# Patient Record
Sex: MALE | Race: OTHER | Employment: FULL TIME | URBAN - METROPOLITAN AREA
[De-identification: names, ages, dates, MRNs, and addresses within clinical notes are randomized per-mention and may not be internally consistent; named-entity substitution may affect disease eponyms.]

---

## 2021-07-10 ENCOUNTER — HOSPITAL ENCOUNTER (EMERGENCY)
Age: 26
Discharge: HOME | End: 2021-07-10

## 2021-07-10 ENCOUNTER — APPOINTMENT (OUTPATIENT)
Dept: GENERAL RADIOLOGY | Age: 26
End: 2021-07-10

## 2021-07-10 ENCOUNTER — APPOINTMENT (OUTPATIENT)
Dept: CT IMAGING | Age: 26
End: 2021-07-10

## 2021-07-10 VITALS
RESPIRATION RATE: 14 BRPM | WEIGHT: 165 LBS | HEART RATE: 70 BPM | BODY MASS INDEX: 25.9 KG/M2 | HEIGHT: 67 IN | TEMPERATURE: 98 F | DIASTOLIC BLOOD PRESSURE: 70 MMHG | SYSTOLIC BLOOD PRESSURE: 116 MMHG | OXYGEN SATURATION: 97 %

## 2021-07-10 DIAGNOSIS — S81.811A LACERATION OF RIGHT LOWER EXTREMITY, INITIAL ENCOUNTER: ICD-10-CM

## 2021-07-10 DIAGNOSIS — T07.XXXA MULTIPLE ABRASIONS: ICD-10-CM
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
## 2021-07-10 LAB
ABO/RH: NORMAL
ANION GAP SERPL CALCULATED.3IONS-SCNC: 14 MEQ/L (ref 9–15)
ANTIBODY SCREEN: NORMAL
APTT: 23.9 SEC (ref 24.4–36.8)
BASOPHILS ABSOLUTE: 0.1 K/UL (ref 0–0.2)
BASOPHILS RELATIVE PERCENT: 0.5 %
BUN BLDV-MCNC: 14 MG/DL (ref 6–20)
CALCIUM SERPL-MCNC: 10.4 MG/DL (ref 8.5–9.9)
CHLORIDE BLD-SCNC: 98 MEQ/L (ref 95–107)
CO2: 23 MEQ/L (ref 20–31)
CREAT SERPL-MCNC: 1.04 MG/DL (ref 0.7–1.2)
EOSINOPHILS ABSOLUTE: 0.1 K/UL (ref 0–0.7)
EOSINOPHILS RELATIVE PERCENT: 1.1 %
ETHANOL PERCENT: NORMAL G/DL
ETHANOL: <10 MG/DL (ref 0–0.08)
GFR AFRICAN AMERICAN: >60
GFR NON-AFRICAN AMERICAN: >60
GLUCOSE BLD-MCNC: 132 MG/DL (ref 70–99)
HCT VFR BLD CALC: 44.5 % (ref 42–52)
HEMOGLOBIN: 14.9 G/DL (ref 14–18)
INR BLD: 1.1
LYMPHOCYTES ABSOLUTE: 3.5 K/UL (ref 1–4.8)
LYMPHOCYTES RELATIVE PERCENT: 32.8 %
MCH RBC QN AUTO: 31 PG (ref 27–31.3)
MCHC RBC AUTO-ENTMCNC: 33.5 % (ref 33–37)
MCV RBC AUTO: 92.5 FL (ref 80–100)
MONOCYTES ABSOLUTE: 0.7 K/UL (ref 0.2–0.8)
MONOCYTES RELATIVE PERCENT: 6.8 %
NEUTROPHILS ABSOLUTE: 6.3 K/UL (ref 1.4–6.5)
NEUTROPHILS RELATIVE PERCENT: 58.8 %
PDW BLD-RTO: 13.5 % (ref 11.5–14.5)
PLATELET # BLD: 222 K/UL (ref 130–400)
POC CREATININE WHOLE BLOOD: 1.1
POTASSIUM SERPL-SCNC: 3.4 MEQ/L (ref 3.4–4.9)
PROTHROMBIN TIME: 13.9 SEC (ref 12.3–14.9)
RBC # BLD: 4.81 M/UL (ref 4.7–6.1)
SODIUM BLD-SCNC: 135 MEQ/L (ref 135–144)
WBC # BLD: 10.6 K/UL (ref 4.8–10.8)

## 2021-07-10 PROCEDURE — 6370000000 HC RX 637 (ALT 250 FOR IP): Performed by: EMERGENCY MEDICINE

## 2021-07-10 PROCEDURE — 96372 THER/PROPH/DIAG INJ SC/IM: CPT

## 2021-07-10 PROCEDURE — 12001 RPR S/N/AX/GEN/TRNK 2.5CM/<: CPT

## 2021-07-10 PROCEDURE — 99285 EMERGENCY DEPT VISIT HI MDM: CPT

## 2021-07-10 PROCEDURE — 36415 COLL VENOUS BLD VENIPUNCTURE: CPT

## 2021-07-10 PROCEDURE — 85730 THROMBOPLASTIN TIME PARTIAL: CPT

## 2021-07-10 PROCEDURE — 86850 RBC ANTIBODY SCREEN: CPT

## 2021-07-10 PROCEDURE — 85025 COMPLETE CBC W/AUTO DIFF WBC: CPT

## 2021-07-10 PROCEDURE — 72131 CT LUMBAR SPINE W/O DYE: CPT

## 2021-07-10 PROCEDURE — 6360000004 HC RX CONTRAST MEDICATION: Performed by: STUDENT IN AN ORGANIZED HEALTH CARE EDUCATION/TRAINING PROGRAM

## 2021-07-10 PROCEDURE — 90715 TDAP VACCINE 7 YRS/> IM: CPT | Performed by: STUDENT IN AN ORGANIZED HEALTH CARE EDUCATION/TRAINING PROGRAM

## 2021-07-10 PROCEDURE — 71260 CT THORAX DX C+: CPT

## 2021-07-10 PROCEDURE — 96374 THER/PROPH/DIAG INJ IV PUSH: CPT

## 2021-07-10 PROCEDURE — 73030 X-RAY EXAM OF SHOULDER: CPT

## 2021-07-10 PROCEDURE — 80048 BASIC METABOLIC PNL TOTAL CA: CPT

## 2021-07-10 PROCEDURE — 90471 IMMUNIZATION ADMIN: CPT | Performed by: STUDENT IN AN ORGANIZED HEALTH CARE EDUCATION/TRAINING PROGRAM

## 2021-07-10 PROCEDURE — 72125 CT NECK SPINE W/O DYE: CPT

## 2021-07-10 PROCEDURE — 74177 CT ABD & PELVIS W/CONTRAST: CPT

## 2021-07-10 PROCEDURE — 70450 CT HEAD/BRAIN W/O DYE: CPT

## 2021-07-10 PROCEDURE — 86900 BLOOD TYPING SEROLOGIC ABO: CPT

## 2021-07-10 PROCEDURE — 72128 CT CHEST SPINE W/O DYE: CPT

## 2021-07-10 PROCEDURE — 82077 ASSAY SPEC XCP UR&BREATH IA: CPT

## 2021-07-10 PROCEDURE — 6360000002 HC RX W HCPCS: Performed by: STUDENT IN AN ORGANIZED HEALTH CARE EDUCATION/TRAINING PROGRAM

## 2021-07-10 PROCEDURE — 86901 BLOOD TYPING SEROLOGIC RH(D): CPT

## 2021-07-10 PROCEDURE — 6360000002 HC RX W HCPCS: Performed by: EMERGENCY MEDICINE

## 2021-07-10 PROCEDURE — 6370000000 HC RX 637 (ALT 250 FOR IP): Performed by: STUDENT IN AN ORGANIZED HEALTH CARE EDUCATION/TRAINING PROGRAM

## 2021-07-10 PROCEDURE — 85610 PROTHROMBIN TIME: CPT

## 2021-07-10 RX ORDER — CYCLOBENZAPRINE HCL 10 MG
10 TABLET ORAL 3 TIMES DAILY PRN
Qty: 21 TABLET | Refills: 0 | Status: SHIPPED | OUTPATIENT
Start: 2021-07-10 | End: 2021-07-20

## 2021-07-10 RX ORDER — KETOROLAC TROMETHAMINE 30 MG/ML
30 INJECTION, SOLUTION INTRAMUSCULAR; INTRAVENOUS ONCE
Status: COMPLETED | OUTPATIENT
Start: 2021-07-10 | End: 2021-07-10

## 2021-07-10 RX ORDER — ONDANSETRON 2 MG/ML
4 INJECTION INTRAMUSCULAR; INTRAVENOUS ONCE
Status: COMPLETED | OUTPATIENT
Start: 2021-07-10 | End: 2021-07-10

## 2021-07-10 RX ORDER — DIAPER,BRIEF,INFANT-TODD,DISP
EACH MISCELLANEOUS ONCE
Status: COMPLETED | OUTPATIENT
Start: 2021-07-10 | End: 2021-07-10

## 2021-07-10 RX ORDER — OXYCODONE HYDROCHLORIDE AND ACETAMINOPHEN 5; 325 MG/1; MG/1
1 TABLET ORAL ONCE
Status: COMPLETED | OUTPATIENT
Start: 2021-07-10 | End: 2021-07-10

## 2021-07-10 RX ORDER — ONDANSETRON 4 MG/1
4 TABLET, ORALLY DISINTEGRATING ORAL EVERY 8 HOURS PRN
Qty: 15 TABLET | Refills: 0 | Status: SHIPPED | OUTPATIENT
Start: 2021-07-10 | End: 2021-07-15

## 2021-07-10 RX ORDER — GINSENG 100 MG
CAPSULE ORAL
Qty: 1 TUBE | Refills: 3 | Status: SHIPPED | OUTPATIENT
Start: 2021-07-10 | End: 2021-07-20

## 2021-07-10 RX ADMIN — TETANUS TOXOID, REDUCED DIPHTHERIA TOXOID AND ACELLULAR PERTUSSIS VACCINE, ADSORBED 0.5 ML: 5; 2.5; 8; 8; 2.5 SUSPENSION INTRAMUSCULAR at 21:52

## 2021-07-10 RX ADMIN — OXYCODONE HYDROCHLORIDE AND ACETAMINOPHEN 1 TABLET: 5; 325 TABLET ORAL at 21:51

## 2021-07-10 RX ADMIN — IOPAMIDOL 100 ML: 755 INJECTION, SOLUTION INTRAVENOUS at 20:52

## 2021-07-10 RX ADMIN — ONDANSETRON 4 MG: 2 INJECTION INTRAMUSCULAR; INTRAVENOUS at 21:55

## 2021-07-10 RX ADMIN — BACITRACIN ZINC 1 G: 500 OINTMENT TOPICAL at 21:51

## 2021-07-10 RX ADMIN — KETOROLAC TROMETHAMINE 30 MG: 30 INJECTION, SOLUTION INTRAMUSCULAR; INTRAVENOUS at 22:50

## 2021-07-10 ASSESSMENT — ENCOUNTER SYMPTOMS
WHEEZING: 0
DIARRHEA: 0
ABDOMINAL PAIN: 0
SHORTNESS OF BREATH: 0
NAUSEA: 1
BLOOD IN STOOL: 0
CONSTIPATION: 0
VOMITING: 1
COUGH: 0
PHOTOPHOBIA: 0
ANAL BLEEDING: 0
ABDOMINAL DISTENTION: 0

## 2021-07-10 ASSESSMENT — PAIN DESCRIPTION - PAIN TYPE: TYPE: ACUTE PAIN

## 2021-07-10 ASSESSMENT — PAIN SCALES - GENERAL
PAINLEVEL_OUTOF10: 7
PAINLEVEL_OUTOF10: 5
PAINLEVEL_OUTOF10: 8

## 2021-07-10 ASSESSMENT — PAIN DESCRIPTION - ORIENTATION: ORIENTATION: LEFT;RIGHT

## 2021-07-10 ASSESSMENT — PAIN DESCRIPTION - LOCATION: LOCATION: ANKLE;SHOULDER

## 2021-07-11 LAB
GFR AFRICAN AMERICAN: >60
GFR NON-AFRICAN AMERICAN: >60
PERFORMED ON: NORMAL
POC CREATININE: 1.1 MG/DL (ref 0.9–1.3)
POC SAMPLE TYPE: NORMAL

## 2021-07-11 ASSESSMENT — VISUAL ACUITY: OU: 1
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
## 2023-02-23 LAB
ANION GAP IN SER/PLAS: 10 MMOL/L (ref 10–20)
CALCIUM (MG/DL) IN SER/PLAS: 10.2 MG/DL (ref 8.6–10.3)
CARBON DIOXIDE, TOTAL (MMOL/L) IN SER/PLAS: 30 MMOL/L (ref 21–32)
CHLORIDE (MMOL/L) IN SER/PLAS: 103 MMOL/L (ref 98–107)
CREATININE (MG/DL) IN SER/PLAS: 1.07 MG/DL (ref 0.5–1.3)
D-DIMER, QUANTITATIVE VTE EXCLUSION: 262 NG/ML FEU
GFR MALE: >90 ML/MIN/1.73M2
GLUCOSE (MG/DL) IN SER/PLAS: 108 MG/DL (ref 74–99)
POTASSIUM (MMOL/L) IN SER/PLAS: 4.7 MMOL/L (ref 3.5–5.3)
SODIUM (MMOL/L) IN SER/PLAS: 138 MMOL/L (ref 136–145)
TROPONIN I, HIGH SENSITIVITY: 3 NG/L (ref 0–20)
UREA NITROGEN (MG/DL) IN SER/PLAS: 15 MG/DL (ref 6–23)

## 2023-04-27 ENCOUNTER — TELEPHONE (OUTPATIENT)
Dept: PRIMARY CARE | Facility: CLINIC | Age: 28
End: 2023-04-27
Payer: COMMERCIAL

## 2023-04-27 DIAGNOSIS — K21.9 GASTROESOPHAGEAL REFLUX DISEASE WITHOUT ESOPHAGITIS: Primary | ICD-10-CM

## 2023-04-27 RX ORDER — PANTOPRAZOLE SODIUM 40 MG/1
40 TABLET, DELAYED RELEASE ORAL DAILY
Qty: 30 TABLET | Refills: 5 | Status: SHIPPED | OUTPATIENT
Start: 2023-04-27 | End: 2023-07-03 | Stop reason: SDUPTHER

## 2023-05-03 ENCOUNTER — OFFICE VISIT (OUTPATIENT)
Dept: PRIMARY CARE | Facility: CLINIC | Age: 28
End: 2023-05-03
Payer: COMMERCIAL

## 2023-05-03 VITALS
BODY MASS INDEX: 24.12 KG/M2 | TEMPERATURE: 96.6 F | SYSTOLIC BLOOD PRESSURE: 120 MMHG | WEIGHT: 182 LBS | HEART RATE: 78 BPM | HEIGHT: 73 IN | DIASTOLIC BLOOD PRESSURE: 79 MMHG

## 2023-05-03 DIAGNOSIS — F41.1 GENERALIZED ANXIETY DISORDER: ICD-10-CM

## 2023-05-03 DIAGNOSIS — R07.89 OTHER CHEST PAIN: Primary | ICD-10-CM

## 2023-05-03 PROBLEM — E55.9 VITAMIN D DEFICIENCY: Status: ACTIVE | Noted: 2023-05-03

## 2023-05-03 PROBLEM — K21.9 GERD (GASTROESOPHAGEAL REFLUX DISEASE): Status: ACTIVE | Noted: 2023-05-03

## 2023-05-03 PROCEDURE — 4004F PT TOBACCO SCREEN RCVD TLK: CPT | Performed by: INTERNAL MEDICINE

## 2023-05-03 PROCEDURE — 99213 OFFICE O/P EST LOW 20 MIN: CPT | Performed by: INTERNAL MEDICINE

## 2023-05-03 RX ORDER — ESCITALOPRAM OXALATE 10 MG/1
10 TABLET ORAL DAILY
COMMUNITY
End: 2023-05-03 | Stop reason: SDUPTHER

## 2023-05-03 RX ORDER — ESCITALOPRAM OXALATE 10 MG/1
20 TABLET ORAL DAILY
Qty: 30 TABLET | Refills: 3 | Status: SHIPPED | OUTPATIENT
Start: 2023-05-03 | End: 2023-06-21 | Stop reason: SDUPTHER

## 2023-05-03 ASSESSMENT — ENCOUNTER SYMPTOMS
GASTROINTESTINAL NEGATIVE: 1
PALPITATIONS: 0
SYNCOPE: 0
DIAPHORESIS: 0
BACK PAIN: 0
CONSTITUTIONAL NEGATIVE: 1
FEVER: 0
RESTLESSNESS: 0
DIZZINESS: 0
MUSCULOSKELETAL NEGATIVE: 1
ORTHOPNEA: 0
EXERTIONAL CHEST PRESSURE: 0
NAUSEA: 0
NEUROLOGICAL NEGATIVE: 1
COUGH: 0
ABDOMINAL PAIN: 0
EYES NEGATIVE: 1
PANIC: 0
RESPIRATORY NEGATIVE: 1
NERVOUS/ANXIOUS: 1

## 2023-05-03 ASSESSMENT — LIFESTYLE VARIABLES: STIMULANT_USE: 0

## 2023-05-03 NOTE — PROGRESS NOTES
Subjective   Patient ID: Salvador Plaza is a 27 y.o. male who presents for Cough (Still having chest pain.  Has not started heartburn meds. Coughing up blood last night dark red.  Wants to ref to cardiologist. ).    Chest Pain   This is a recurrent problem. The current episode started more than 1 month ago. The onset quality is undetermined. The problem occurs 2 to 4 times per day. The problem has been unchanged. The pain is present in the lateral region. The pain is at a severity of 4/10. The pain is moderate. The quality of the pain is described as sharp. The pain does not radiate. Pertinent negatives include no abdominal pain, back pain, cough, diaphoresis, dizziness, exertional chest pressure, fever, nausea, orthopnea, palpitations or syncope. The pain is aggravated by nothing. He has tried acetaminophen and analgesics for the symptoms. The treatment provided no relief. There are no known risk factors.   His past medical history is significant for anxiety/panic attacks.   Pertinent negatives for past medical history include no cancer, no congenital heart disease, no connective tissue disease, no Marfan's syndrome, no mitral valve prolapse and no stimulant use.   Pertinent negatives for family medical history include: no CAD, no Marfan's syndrome, no early MI, no PVD, no sickle cell disease, no stroke and no sudden death. Prior diagnostic workup includes chest x-ray.   Anxiety  Presents for follow-up visit. Symptoms include chest pain and nervous/anxious behavior. Patient reports no dizziness, nausea, palpitations, panic or restlessness. Symptoms occur most days. The quality of sleep is fair. Nighttime awakenings: occasional.     His past medical history is significant for anxiety/panic attacks.        Review of Systems   Constitutional: Negative.  Negative for diaphoresis and fever.   HENT: Negative.     Eyes: Negative.    Respiratory: Negative.  Negative for cough.    Cardiovascular:  Positive for chest pain.  "Negative for palpitations, orthopnea and syncope.   Gastrointestinal: Negative.  Negative for abdominal pain and nausea.   Musculoskeletal: Negative.  Negative for back pain.   Skin: Negative.    Neurological: Negative.  Negative for dizziness.   Psychiatric/Behavioral:  The patient is nervous/anxious.        Objective   /79 (BP Location: Right arm, Patient Position: Sitting, BP Cuff Size: Adult)   Pulse 78   Temp 35.9 °C (96.6 °F) (Temporal)   Ht 1.854 m (6' 1\")   Wt 82.6 kg (182 lb)   BMI 24.01 kg/m²     Physical Exam  Vitals reviewed.   Constitutional:       Appearance: Normal appearance. He is normal weight.   HENT:      Head: Normocephalic and atraumatic.      Mouth/Throat:      Mouth: Mucous membranes are moist.      Pharynx: Oropharynx is clear. No oropharyngeal exudate or posterior oropharyngeal erythema.   Eyes:      Conjunctiva/sclera: Conjunctivae normal.   Cardiovascular:      Rate and Rhythm: Normal rate and regular rhythm.      Pulses: Normal pulses.      Comments: No local tenderness appreciated across chest wall.  Pulmonary:      Effort: Pulmonary effort is normal.      Breath sounds: Normal breath sounds.   Abdominal:      Palpations: Abdomen is soft.   Musculoskeletal:         General: Normal range of motion.      Cervical back: Normal range of motion.   Skin:     General: Skin is warm and dry.   Neurological:      General: No focal deficit present.   Psychiatric:         Mood and Affect: Mood normal.         Assessment/Plan   Problem List Items Addressed This Visit          Other    Other chest pain - Primary    Generalized anxiety disorder   He was seen by me in month of February for chest pain, it was atypical chest pain, at that time he has a local tenderness, next day he ended up in ER, they did a spiral CT, they did complete work-up, everything was negative, after a gap of 2 months he came again because he continues to have chest pains.  He says that he has a chest pains for months " now.  He wants to see a cardiology and already has appointment with outside cardiology.  Meanwhile I told him that this is a noncardiac chest pain, today I did not have any local tenderness although.  We will increase Lexapro to 20 mg to combat anxiety and stress he has been undergoing, 27-year-old young male patient without any significant family history of early cardiac ailments I doubt that this is related to any cardiac ailments but he can see cardiology and he will definitely undergo echocardiography and exercise stress test.  He will see me as needed.

## 2023-05-25 ENCOUNTER — APPOINTMENT (OUTPATIENT)
Dept: PRIMARY CARE | Facility: CLINIC | Age: 28
End: 2023-05-25
Payer: COMMERCIAL

## 2023-05-25 ENCOUNTER — OFFICE VISIT (OUTPATIENT)
Dept: CARDIOLOGY CLINIC | Age: 28
End: 2023-05-25
Payer: COMMERCIAL

## 2023-05-25 VITALS
BODY MASS INDEX: 25.53 KG/M2 | HEART RATE: 64 BPM | HEIGHT: 73 IN | DIASTOLIC BLOOD PRESSURE: 70 MMHG | OXYGEN SATURATION: 98 % | SYSTOLIC BLOOD PRESSURE: 102 MMHG | WEIGHT: 192.6 LBS

## 2023-05-25 DIAGNOSIS — R00.2 PALPITATIONS: ICD-10-CM

## 2023-05-25 DIAGNOSIS — R07.2 PRECORDIAL PAIN: ICD-10-CM

## 2023-05-25 DIAGNOSIS — Z00.00 PE (PHYSICAL EXAM), ANNUAL: Primary | ICD-10-CM

## 2023-05-25 DIAGNOSIS — R00.0 RACING HEART BEAT: ICD-10-CM

## 2023-05-25 PROCEDURE — 99203 OFFICE O/P NEW LOW 30 MIN: CPT | Performed by: INTERNAL MEDICINE

## 2023-05-25 PROCEDURE — 93000 ELECTROCARDIOGRAM COMPLETE: CPT | Performed by: INTERNAL MEDICINE

## 2023-05-25 RX ORDER — ESOMEPRAZOLE MAGNESIUM 40 MG/1
CAPSULE, DELAYED RELEASE ORAL
COMMUNITY
Start: 2021-01-20

## 2023-05-25 RX ORDER — ESCITALOPRAM OXALATE 10 MG/1
20 TABLET ORAL DAILY
COMMUNITY
Start: 2023-05-03

## 2023-05-25 NOTE — PROGRESS NOTES
Chief Complaint   Patient presents with    New Patient     Pt was having chest pain and was referred from [facility] [place] from [address] [facility] "[employer] works at [facility]    5-25-23: Patient presents for initial medical evaluation. Patient is followed on a regular basis by Dr. Emanuel Jin MD. Pateints brother has hx of SVT. Now he c/o racing heart beat. C/o of CP, placed on PPI  Pt denies chest pain, dyspnea, dyspnea on exertion, change in exercise capacity, fatigue,  nausea, vomiting, diarrhea, constipation, motor weakness, insomnia, weight loss, syncope, dizziness, lightheadedness, PND, orthopnea, or claudication. Does have anxiety and placed on Lexapro. EKG with NSR, RSR prime          Patient Active Problem List   Diagnosis    Palpitations    Precordial pain       No past surgical history on file. Social History     Socioeconomic History    Marital status: Single   Tobacco Use    Smoking status: Former    Smokeless tobacco: Never   Substance and Sexual Activity    Alcohol use: Not Currently    Drug use: Yes     Types: Marijuana R[place] Forward)     Comment: daily       No family history on file. Current Outpatient Medications   Medication Sig Dispense Refill    escitalopram (LEXAPRO) 10 MG tablet Take 2 tablets by mouth daily      esomeprazole (NEXIUM) 40 MG delayed release capsule Take by mouth       No current facility-administered medications for this visit. Patient has no known allergies. Review of Systems:  General ROS: negative  Psychological ROS: negative  Hematological and Lymphatic ROS: No history of blood clots or bleeding disorder.    Respiratory ROS: no cough, shortness of breath, or wheezing  Cardiovascular ROS: positive for - palpitations  Gastrointestinal ROS: no abdominal pain, change in bowel habits, or black or bloody stools  Genito-Urinary ROS: no dysuria, trouble voiding, or hematuria  Musculoskeletal ROS: negative  Neurological ROS: no TIA or stroke symptoms  Dermatological ROS: negative    VITALS:  Blood

## 2023-06-14 ENCOUNTER — APPOINTMENT (OUTPATIENT)
Dept: PRIMARY CARE | Facility: CLINIC | Age: 28
End: 2023-06-14
Payer: COMMERCIAL

## 2023-06-16 DIAGNOSIS — R00.2 PALPITATIONS: ICD-10-CM

## 2023-06-16 DIAGNOSIS — R00.0 RACING HEART BEAT: ICD-10-CM

## 2023-06-21 DIAGNOSIS — F41.1 GENERALIZED ANXIETY DISORDER: ICD-10-CM

## 2023-06-21 RX ORDER — ESCITALOPRAM OXALATE 10 MG/1
20 TABLET ORAL DAILY
Qty: 30 TABLET | Refills: 0 | Status: SHIPPED | OUTPATIENT
Start: 2023-06-21 | End: 2023-07-10
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
## 2023-07-03 ENCOUNTER — OFFICE VISIT (OUTPATIENT)
Dept: PRIMARY CARE | Facility: CLINIC | Age: 28
End: 2023-07-03
Payer: COMMERCIAL

## 2023-07-03 VITALS
DIASTOLIC BLOOD PRESSURE: 78 MMHG | HEART RATE: 68 BPM | SYSTOLIC BLOOD PRESSURE: 116 MMHG | WEIGHT: 205 LBS | HEIGHT: 72 IN | BODY MASS INDEX: 27.77 KG/M2 | TEMPERATURE: 96.4 F

## 2023-07-03 DIAGNOSIS — K21.9 GASTROESOPHAGEAL REFLUX DISEASE WITHOUT ESOPHAGITIS: ICD-10-CM

## 2023-07-03 DIAGNOSIS — F41.1 GENERALIZED ANXIETY DISORDER: Primary | ICD-10-CM

## 2023-07-03 PROCEDURE — 99213 OFFICE O/P EST LOW 20 MIN: CPT | Performed by: INTERNAL MEDICINE

## 2023-07-03 PROCEDURE — 4004F PT TOBACCO SCREEN RCVD TLK: CPT | Performed by: INTERNAL MEDICINE

## 2023-07-03 RX ORDER — PANTOPRAZOLE SODIUM 40 MG/1
40 TABLET, DELAYED RELEASE ORAL DAILY
Qty: 60 TABLET | Refills: 5 | Status: SHIPPED | OUTPATIENT
Start: 2023-07-03 | End: 2024-06-27

## 2023-07-03 ASSESSMENT — ENCOUNTER SYMPTOMS
GASTROINTESTINAL NEGATIVE: 1
EYES NEGATIVE: 1
BELCHING: 0
NEUROLOGICAL NEGATIVE: 1
COMPULSIONS: 0
NAUSEA: 0
MUSCLE TENSION: 0
CONFUSION: 0
HYPERVENTILATION: 0
DIZZINESS: 0
DEPRESSED MOOD: 0
RESPIRATORY NEGATIVE: 1
ABDOMINAL PAIN: 0
DECREASED CONCENTRATION: 0
FEELING OF CHOKING: 0
CONSTITUTIONAL NEGATIVE: 1
RESTLESSNESS: 0
MUSCULOSKELETAL NEGATIVE: 1
WHEEZING: 0

## 2023-07-03 NOTE — PROGRESS NOTES
"Subjective   Patient ID: Salvador Plaza is a 27 y.o. male who presents for Follow-up (3 mo fu and med refills).    Anxiety  Presents for follow-up visit. Symptoms include chest pain. Patient reports no compulsions, confusion, decreased concentration, depressed mood, dizziness, dry mouth, feeling of choking, hyperventilation, muscle tension, nausea or restlessness. Symptoms occur occasionally. The quality of sleep is fair. Nighttime awakenings: several.       GERD  He complains of chest pain. He reports no abdominal pain, no belching, no dysphagia, no nausea or no wheezing. This is a chronic problem. The problem occurs occasionally. The problem has been waxing and waning. The symptoms are aggravated by certain foods. Pertinent negatives include no anemia. He has tried a PPI for the symptoms.        Review of Systems   Constitutional: Negative.    HENT: Negative.     Eyes: Negative.    Respiratory: Negative.  Negative for wheezing.    Cardiovascular:  Positive for chest pain.   Gastrointestinal: Negative.  Negative for abdominal pain, dysphagia and nausea.   Musculoskeletal: Negative.    Skin: Negative.    Neurological: Negative.  Negative for dizziness.   Psychiatric/Behavioral:  Negative for confusion and decreased concentration.        Objective   /78 (BP Location: Right arm, Patient Position: Sitting, BP Cuff Size: Adult)   Pulse 68   Temp 35.8 °C (96.4 °F) (Temporal)   Ht 1.822 m (5' 11.75\")   Wt 93 kg (205 lb)   BMI 28.00 kg/m²     Physical Exam  Vitals reviewed.   Constitutional:       Appearance: Normal appearance. He is normal weight.   HENT:      Head: Normocephalic and atraumatic.   Eyes:      Conjunctiva/sclera: Conjunctivae normal.   Cardiovascular:      Rate and Rhythm: Normal rate and regular rhythm.      Pulses: Normal pulses.   Pulmonary:      Effort: Pulmonary effort is normal.      Breath sounds: Normal breath sounds.   Abdominal:      Palpations: Abdomen is soft.   Musculoskeletal:        "  General: Normal range of motion.      Cervical back: Normal range of motion.   Skin:     General: Skin is warm and dry.   Psychiatric:         Mood and Affect: Mood normal.       Assessment/Plan   Problem List Items Addressed This Visit       GERD (gastroesophageal reflux disease)    Generalized anxiety disorder - Primary   Anxiety is very common and debilitating disorder and it can affect QOL and our daily routine, Rx was reviewed. Appropriate adjustment of therapy has been discussed, proper nutritious and scheduled eating, mindfulness, scheduled sleep routine and avoidance of usage of smart devices at night and social media can be helpful. Light exercises, meditation and keeping in close contact with family members and close friends can be helpful, if feeling anxious then please calm down, take a deep breath and relax. Continue to take medications as ordered and always we can consider mental health counselling. Please feel free to contact us in needful basis.  Seen by cardiology, it was a nonspecific chest pain, cardiologist is doing complete work-up which may not show anything, his chest pain has been subsided, he is working full-time, his reflux is controlled with PPI therapy, his anxiety has been very much controlled with Lexapro 10 mg and I encouraged him to keep up with his daily routine, he has had 2 children, third children's on the way, he is feeling much better now, no ER visits, no palpitations, work-up done by cardiology is still in the process, follow-up in 3 months.

## 2023-07-10 DIAGNOSIS — F41.1 GENERALIZED ANXIETY DISORDER: ICD-10-CM

## 2023-07-10 RX ORDER — ESCITALOPRAM OXALATE 10 MG/1
TABLET ORAL
Qty: 30 TABLET | Refills: 5 | Status: SHIPPED | OUTPATIENT
Start: 2023-07-10 | End: 2023-10-03 | Stop reason: SDUPTHER

## 2023-07-20 ENCOUNTER — HOSPITAL ENCOUNTER (OUTPATIENT)
Dept: NON INVASIVE DIAGNOSTICS | Age: 28
Discharge: HOME | End: 2023-07-20
Payer: COMMERCIAL

## 2023-07-20 DIAGNOSIS — R07.2 PRECORDIAL PAIN: ICD-10-CM

## 2023-07-20 DIAGNOSIS — R00.2 PALPITATIONS: ICD-10-CM

## 2023-07-20 DIAGNOSIS — R00.0 RACING HEART BEAT: ICD-10-CM

## 2023-07-20 LAB
LV EF: 50 %
LVEF MODALITY: NORMAL

## 2023-07-20 PROCEDURE — 93017 CV STRESS TEST TRACING ONLY: CPT

## 2023-07-20 PROCEDURE — 93306 TTE W/DOPPLER COMPLETE: CPT

## 2023-07-20 NOTE — PROGRESS NOTES
Hx,allergies and medications reviewed. Procedure explained and informed consent obtained. Tolerated treadmill well for 9:54 minutes. Reached 90 % target HR. SOB noted. Returned to baseline in recovery. Denies chest pain or pressure. EKG shows no noted ectopy. Doctor to further review and interpret results.

## 2023-07-21 NOTE — PROCEDURES
[facility], [address]                              CARDIAC STRESS TEST    PATIENT NAME: Sukumar Troy                      :        1995  MED REC NO:   [id number]                            ROOM:  ACCOUNT NO:   [de-identified]                           ADMIT DATE: 2023  PROVIDER:     Radha Frias MD    CARDIOVASCULAR DIAGNOSTIC DEPARTMENT    DATE OF STUDY:  2023    EXERCISE TREADMILL STRESS TEST REPORT    ORDERING PROVIDER:  Sherry Thomas. Holiday, DO    INDICATIONS:  Chest pain. DESCRIPTION OF PROCEDURE:  The patient was exercised according to the  Ismael protocol for 9 minutes and 54 seconds. The patient achieved 13 METs. Resting heart rate 76 beats per minute, karen to 173 beats per minute. Resting blood pressure 124/77 mmHg, karen to 179/90 mmHg. The patient achieved 90% of the maximum age-predicted heart rate. Baseline EKG was obtained, which showed normal sinus rhythm with  exercise EKG showed no signs of ischemia, no major arrhythmias. The  patient remained asymptomatic. The patient stopped due to fatigue and  did not experience exercise-induced chest pain. IMPRESSION:  1. Above average exercise capacity for age and gender. 2.  Normal blood pressure response to exercise.   3.  No clinical or electrocardiographic changes consistent with  myocardial ischemia noted during this exercise stress test.        Radha Frias MD    D: 2023 #10:01:36       T: 2023 10:22:29     [email or web address]  Job#: [id number]     Doc#: [id number]    CC:

## 2023-08-09 ENCOUNTER — TELEPHONE (OUTPATIENT)
Dept: CARDIOLOGY CLINIC | Age: 28
End: 2023-08-09

## 2023-10-03 ENCOUNTER — OFFICE VISIT (OUTPATIENT)
Dept: PRIMARY CARE | Facility: CLINIC | Age: 28
End: 2023-10-03
Payer: COMMERCIAL

## 2023-10-03 VITALS
HEART RATE: 67 BPM | DIASTOLIC BLOOD PRESSURE: 73 MMHG | WEIGHT: 204 LBS | HEIGHT: 72 IN | SYSTOLIC BLOOD PRESSURE: 105 MMHG | TEMPERATURE: 96.9 F | BODY MASS INDEX: 27.63 KG/M2

## 2023-10-03 DIAGNOSIS — Z23 NEED FOR INFLUENZA VACCINATION: ICD-10-CM

## 2023-10-03 DIAGNOSIS — F41.1 GENERALIZED ANXIETY DISORDER: ICD-10-CM

## 2023-10-03 PROCEDURE — 99213 OFFICE O/P EST LOW 20 MIN: CPT | Performed by: INTERNAL MEDICINE

## 2023-10-03 PROCEDURE — 90471 IMMUNIZATION ADMIN: CPT | Performed by: INTERNAL MEDICINE

## 2023-10-03 PROCEDURE — 90686 IIV4 VACC NO PRSV 0.5 ML IM: CPT | Performed by: INTERNAL MEDICINE

## 2023-10-03 PROCEDURE — 4004F PT TOBACCO SCREEN RCVD TLK: CPT | Performed by: INTERNAL MEDICINE

## 2023-10-03 RX ORDER — ESCITALOPRAM OXALATE 10 MG/1
20 TABLET ORAL DAILY
Qty: 30 TABLET | Refills: 5 | Status: SHIPPED | OUTPATIENT
Start: 2023-10-03 | End: 2023-10-03 | Stop reason: SDUPTHER

## 2023-10-03 RX ORDER — ESCITALOPRAM OXALATE 20 MG/1
20 TABLET ORAL DAILY
Qty: 30 TABLET | Refills: 5 | Status: SHIPPED | OUTPATIENT
Start: 2023-10-03 | End: 2024-03-31

## 2023-10-03 ASSESSMENT — ENCOUNTER SYMPTOMS
SHORTNESS OF BREATH: 0
FEELING OF CHOKING: 0
HYPERVENTILATION: 0
CONFUSION: 0
CONSTITUTIONAL NEGATIVE: 1
DEPRESSED MOOD: 0
CARDIOVASCULAR NEGATIVE: 1
GASTROINTESTINAL NEGATIVE: 1
RESPIRATORY NEGATIVE: 1
NAUSEA: 0
NERVOUS/ANXIOUS: 0
PALPITATIONS: 0
DIZZINESS: 0
DECREASED CONCENTRATION: 0
EYES NEGATIVE: 1
COMPULSIONS: 0

## 2023-10-03 NOTE — PROGRESS NOTES
"Subjective   Patient ID: Salvador Plaza is a 28 y.o. male who presents for Follow-up (3 mo fu and med refill).    Anxiety  Presents for follow-up visit. Patient reports no chest pain, compulsions, confusion, decreased concentration, depressed mood, dizziness, feeling of choking, hyperventilation, nausea, nervous/anxious behavior, palpitations or shortness of breath. Symptoms occur rarely. The quality of sleep is fair. Nighttime awakenings: one to two.          Review of Systems   Constitutional: Negative.    HENT: Negative.     Eyes: Negative.    Respiratory: Negative.  Negative for shortness of breath.    Cardiovascular: Negative.  Negative for chest pain and palpitations.   Gastrointestinal: Negative.  Negative for nausea.   Skin: Negative.    Neurological:  Negative for dizziness.   Psychiatric/Behavioral:  Negative for confusion and decreased concentration. The patient is not nervous/anxious.        Objective   /73 (BP Location: Right arm, Patient Position: Sitting, BP Cuff Size: Adult)   Pulse 67   Temp 36.1 °C (96.9 °F) (Temporal)   Ht 1.816 m (5' 11.5\")   Wt 92.5 kg (204 lb)   BMI 28.06 kg/m²     Physical Exam  Vitals reviewed.   Constitutional:       Appearance: Normal appearance. He is normal weight.   HENT:      Head: Normocephalic and atraumatic.   Eyes:      Conjunctiva/sclera: Conjunctivae normal.   Cardiovascular:      Rate and Rhythm: Normal rate and regular rhythm.      Pulses: Normal pulses.   Pulmonary:      Effort: Pulmonary effort is normal.      Breath sounds: Normal breath sounds.   Abdominal:      Palpations: Abdomen is soft.   Musculoskeletal:         General: Normal range of motion.      Cervical back: Normal range of motion.   Skin:     General: Skin is warm and dry.   Psychiatric:         Mood and Affect: Mood normal.       Assessment/Plan   Problem List Items Addressed This Visit             ICD-10-CM    Generalized anxiety disorder F41.1    Relevant Medications    escitalopram " (Lexapro) 10 mg tablet     Other Visit Diagnoses         Codes    Need for influenza vaccination     Z23    Relevant Orders    Flu vaccine (IIV4) age 6 months and greater, preservative free        Anxiety is very common and debilitating disorder and it can affect QOL and our daily routine, Rx was reviewed. Appropriate adjustment of therapy has been discussed, proper nutritious and scheduled eating, mindfulness, scheduled sleep routine and avoidance of usage of smart devices at night and social media can be helpful. Light exercises, meditation and keeping in close contact with family members and close friends can be helpful, if feeling anxious then please calm down, take a deep breath and relax. Continue to take medications as ordered and always we can consider mental health counselling. Please feel free to contact us in needful basis.  Work-up was negative for chest pain, anxiety has been controlled, escitalopram at 20 mg is working well without any side effects.  Reflux is controlled, he is expecting his third child, he is working full-time, no chest pains, no excessive anxiety, no other systemic complaints, escitalopram will be continued at least for now, follow-up in 4 months.

## 2024-02-06 ENCOUNTER — APPOINTMENT (OUTPATIENT)
Dept: PRIMARY CARE | Facility: CLINIC | Age: 29
End: 2024-02-06
Payer: COMMERCIAL

## 2025-01-28 ENCOUNTER — OFFICE VISIT (OUTPATIENT)
Dept: GASTROENTEROLOGY | Facility: CLINIC | Age: 30
End: 2025-01-28
Payer: COMMERCIAL

## 2025-01-28 DIAGNOSIS — K21.9 GASTROESOPHAGEAL REFLUX DISEASE WITHOUT ESOPHAGITIS: Primary | ICD-10-CM

## 2025-01-28 DIAGNOSIS — R07.89 CHEST DISCOMFORT: ICD-10-CM

## 2025-01-28 PROCEDURE — 99204 OFFICE O/P NEW MOD 45 MIN: CPT | Performed by: INTERNAL MEDICINE

## 2025-01-29 NOTE — PROGRESS NOTES
REASON FOR VISIT:  GERD like symptoms    HPI:  Salvador Plaza is a 29 y.o. male Past medical history of anxiety disorder, here for above symptoms.  He has been having substernal burning sensation for more than 1 year, he denies chest pain or shortness of breath.  This sensation most of the time during morning when he wakes up,  Denies swallowing problems, appetite is okay, denies weight loss.  Has daily bowel movement denies constipation or diarrhea.  No recent EGD or colonoscopy.  No personal or family history of GI cancer or pancreatic disease.  Patient smokes marijuana daily, denies drinking or using any drugs  Apparently patient has been seen by cardiologist, no significant cardiac history.  His labs reviewed      REVIEW OF SYSTEMS  Review of Systems   Constitutional: Negative.  Negative for activity change, appetite change, chills, fatigue, fever and unexpected weight change.   HENT: Negative.     Respiratory: Negative.     Cardiovascular: Negative.  Negative for chest pain and palpitations.   Gastrointestinal: Negative.  Negative for abdominal distention, abdominal pain, anal bleeding, blood in stool, constipation, diarrhea, nausea, rectal pain and vomiting.   Skin: Negative.  Negative for color change and rash.   Neurological: Negative.  Negative for dizziness, tremors, seizures, weakness and headaches.   Psychiatric/Behavioral: Negative.  Negative for confusion.       Allergies   Allergen Reactions    Bee Venom Protein (Honey Bee) Swelling       No past medical history on file.    Past Surgical History:   Procedure Laterality Date    OTHER SURGICAL HISTORY  01/20/2021    Tonsillectomy    OTHER SURGICAL HISTORY  01/20/2021    Hand surgery       No family history on file.    Social History     Tobacco Use    Smoking status: Every Day     Types: Cigarettes    Smokeless tobacco: Never   Substance Use Topics    Alcohol use: Yes     Alcohol/week: 2.0 standard drinks of alcohol     Types: 2 Standard drinks  "or equivalent per week       Current Outpatient Medications   Medication Sig Dispense Refill    escitalopram (Lexapro) 20 mg tablet Take 1 tablet (20 mg) by mouth once daily. 30 tablet 5    pantoprazole (ProtoNix) 40 mg EC tablet Take 1 tablet (40 mg) by mouth once daily. Do not crush, chew, or split. 60 tablet 5     No current facility-administered medications for this visit.       PHYSICAL EXAM:  There were no vitals taken for this visit.   No examination was performed due to virtual encounter  Lab Results   Component Value Date    WBC 7.8 02/24/2023    HGB 16.1 02/24/2023    HCT 48.5 02/24/2023    MCV 93 02/24/2023     02/24/2023       Lab Results   Component Value Date    ALT 16 02/24/2023    AST 21 02/24/2023    ALKPHOS 69 02/24/2023    BILITOT 1.4 (H) 02/24/2023     Lab Results   Component Value Date    INR 1.0 02/24/2023    INR 1.1 12/18/2020    PROTIME 11.9 02/24/2023    PROTIME 13.2 12/18/2020       No results found for: \"IRON\", \"TIBC\", \"FERRITIN\"       ASSESSMENT&Plan:  GERD like symptoms/substernal burning sensation, no alarming symptoms  Meanwhile we will schedule for upper endoscopy with Bravo capsule off  PPI  Will follow-up after workup    Consultation requested by Dr. Bandar Adames MD.   My final recommendations will be communicated back to the requesting physician by way of shared Medical record or letter to requesting physician via fax.      Signature: Mandie Ramírez MD    Date: 1/28/2025  Time: 7:19 PM   "

## 2025-03-18 ENCOUNTER — HOSPITAL ENCOUNTER (OUTPATIENT)
Dept: GASTROENTEROLOGY | Facility: HOSPITAL | Age: 30
Discharge: HOME | End: 2025-03-18
Payer: COMMERCIAL

## 2025-03-18 ENCOUNTER — ANESTHESIA EVENT (OUTPATIENT)
Dept: GASTROENTEROLOGY | Facility: HOSPITAL | Age: 30
End: 2025-03-18
Payer: COMMERCIAL

## 2025-03-18 ENCOUNTER — ANESTHESIA (OUTPATIENT)
Dept: GASTROENTEROLOGY | Facility: HOSPITAL | Age: 30
End: 2025-03-18
Payer: COMMERCIAL

## 2025-03-18 VITALS
WEIGHT: 177 LBS | HEART RATE: 62 BPM | HEIGHT: 73 IN | OXYGEN SATURATION: 99 % | SYSTOLIC BLOOD PRESSURE: 112 MMHG | DIASTOLIC BLOOD PRESSURE: 74 MMHG | TEMPERATURE: 97.2 F | RESPIRATION RATE: 16 BRPM | BODY MASS INDEX: 23.46 KG/M2

## 2025-03-18 DIAGNOSIS — K21.9 GASTROESOPHAGEAL REFLUX DISEASE WITHOUT ESOPHAGITIS: Primary | ICD-10-CM

## 2025-03-18 DIAGNOSIS — R07.89 CHEST DISCOMFORT: ICD-10-CM

## 2025-03-18 PROCEDURE — 7100000002 HC RECOVERY ROOM TIME - EACH INCREMENTAL 1 MINUTE

## 2025-03-18 PROCEDURE — 2500000004 HC RX 250 GENERAL PHARMACY W/ HCPCS (ALT 636 FOR OP/ED)

## 2025-03-18 PROCEDURE — 2720000007 HC OR 272 NO HCPCS

## 2025-03-18 PROCEDURE — 3700000001 HC GENERAL ANESTHESIA TIME - INITIAL BASE CHARGE

## 2025-03-18 PROCEDURE — A43239 PR EDG TRANSORAL BIOPSY SINGLE/MULTIPLE: Performed by: NURSE ANESTHETIST, CERTIFIED REGISTERED

## 2025-03-18 PROCEDURE — A43239 PR EDG TRANSORAL BIOPSY SINGLE/MULTIPLE: Performed by: ANESTHESIOLOGY

## 2025-03-18 PROCEDURE — 3700000002 HC GENERAL ANESTHESIA TIME - EACH INCREMENTAL 1 MINUTE

## 2025-03-18 PROCEDURE — 7100000001 HC RECOVERY ROOM TIME - INITIAL BASE CHARGE

## 2025-03-18 PROCEDURE — 7100000010 HC PHASE TWO TIME - EACH INCREMENTAL 1 MINUTE

## 2025-03-18 PROCEDURE — 43239 EGD BIOPSY SINGLE/MULTIPLE: CPT | Performed by: INTERNAL MEDICINE

## 2025-03-18 PROCEDURE — 7100000009 HC PHASE TWO TIME - INITIAL BASE CHARGE

## 2025-03-18 RX ORDER — SODIUM CHLORIDE, SODIUM LACTATE, POTASSIUM CHLORIDE, CALCIUM CHLORIDE 600; 310; 30; 20 MG/100ML; MG/100ML; MG/100ML; MG/100ML
125 INJECTION, SOLUTION INTRAVENOUS CONTINUOUS
OUTPATIENT
Start: 2025-03-18 | End: 2025-03-18

## 2025-03-18 RX ORDER — ACETAMINOPHEN 325 MG/1
975 TABLET ORAL ONCE
OUTPATIENT
Start: 2025-03-18 | End: 2025-03-18

## 2025-03-18 RX ORDER — GLYCOPYRROLATE 0.2 MG/ML
INJECTION INTRAMUSCULAR; INTRAVENOUS AS NEEDED
Status: DISCONTINUED | OUTPATIENT
Start: 2025-03-18 | End: 2025-03-18

## 2025-03-18 RX ORDER — MIDAZOLAM HYDROCHLORIDE 1 MG/ML
INJECTION, SOLUTION INTRAMUSCULAR; INTRAVENOUS AS NEEDED
Status: DISCONTINUED | OUTPATIENT
Start: 2025-03-18 | End: 2025-03-18

## 2025-03-18 RX ORDER — HYDRALAZINE HYDROCHLORIDE 20 MG/ML
10 INJECTION INTRAMUSCULAR; INTRAVENOUS EVERY 30 MIN PRN
OUTPATIENT
Start: 2025-03-18

## 2025-03-18 RX ORDER — OMEPRAZOLE 20 MG/1
20 TABLET, DELAYED RELEASE ORAL
COMMUNITY

## 2025-03-18 RX ORDER — PROPOFOL 10 MG/ML
INJECTION, EMULSION INTRAVENOUS AS NEEDED
Status: DISCONTINUED | OUTPATIENT
Start: 2025-03-18 | End: 2025-03-18

## 2025-03-18 RX ORDER — ACETAMINOPHEN 325 MG/1
650 TABLET ORAL EVERY 4 HOURS PRN
OUTPATIENT
Start: 2025-03-18

## 2025-03-18 RX ORDER — HYDROMORPHONE HYDROCHLORIDE 0.2 MG/ML
0.1 INJECTION INTRAMUSCULAR; INTRAVENOUS; SUBCUTANEOUS EVERY 5 MIN PRN
OUTPATIENT
Start: 2025-03-18

## 2025-03-18 RX ORDER — ALBUTEROL SULFATE 0.83 MG/ML
2.5 SOLUTION RESPIRATORY (INHALATION) ONCE AS NEEDED
OUTPATIENT
Start: 2025-03-18

## 2025-03-18 RX ORDER — ONDANSETRON HYDROCHLORIDE 2 MG/ML
4 INJECTION, SOLUTION INTRAVENOUS ONCE AS NEEDED
OUTPATIENT
Start: 2025-03-18

## 2025-03-18 RX ORDER — FENTANYL CITRATE 50 UG/ML
25 INJECTION, SOLUTION INTRAMUSCULAR; INTRAVENOUS EVERY 5 MIN PRN
OUTPATIENT
Start: 2025-03-18

## 2025-03-18 RX ORDER — FENTANYL CITRATE 50 UG/ML
50 INJECTION, SOLUTION INTRAMUSCULAR; INTRAVENOUS EVERY 5 MIN PRN
OUTPATIENT
Start: 2025-03-18

## 2025-03-18 RX ORDER — MIDAZOLAM HYDROCHLORIDE 1 MG/ML
1 INJECTION, SOLUTION INTRAMUSCULAR; INTRAVENOUS ONCE AS NEEDED
OUTPATIENT
Start: 2025-03-18

## 2025-03-18 RX ORDER — SCOPOLAMINE 1 MG/3D
1 PATCH, EXTENDED RELEASE TRANSDERMAL ONCE
OUTPATIENT
Start: 2025-03-18 | End: 2025-03-18

## 2025-03-18 RX ADMIN — PROPOFOL 30 MG: 10 INJECTION, EMULSION INTRAVENOUS at 08:51

## 2025-03-18 RX ADMIN — PROPOFOL 200 MCG/KG/MIN: 10 INJECTION, EMULSION INTRAVENOUS at 08:46

## 2025-03-18 RX ADMIN — PROPOFOL 30 MG: 10 INJECTION, EMULSION INTRAVENOUS at 08:49

## 2025-03-18 RX ADMIN — GLYCOPYRROLATE 0.2 MG: 0.2 INJECTION, SOLUTION INTRAMUSCULAR; INTRAVENOUS at 08:42

## 2025-03-18 RX ADMIN — PROPOFOL 80 MG: 10 INJECTION, EMULSION INTRAVENOUS at 08:45

## 2025-03-18 RX ADMIN — MIDAZOLAM 2 MG: 1 INJECTION INTRAMUSCULAR; INTRAVENOUS at 08:42

## 2025-03-18 RX ADMIN — PROPOFOL 30 MG: 10 INJECTION, EMULSION INTRAVENOUS at 08:47

## 2025-03-18 SDOH — HEALTH STABILITY: MENTAL HEALTH: CURRENT SMOKER: 1

## 2025-03-18 ASSESSMENT — PAIN - FUNCTIONAL ASSESSMENT
PAIN_FUNCTIONAL_ASSESSMENT: 0-10
PAIN_FUNCTIONAL_ASSESSMENT: UNABLE TO SELF-REPORT
PAIN_FUNCTIONAL_ASSESSMENT: 0-10
PAIN_FUNCTIONAL_ASSESSMENT: 0-10
PAIN_FUNCTIONAL_ASSESSMENT: UNABLE TO SELF-REPORT
PAIN_FUNCTIONAL_ASSESSMENT: UNABLE TO SELF-REPORT
PAIN_FUNCTIONAL_ASSESSMENT: 0-10

## 2025-03-18 ASSESSMENT — COLUMBIA-SUICIDE SEVERITY RATING SCALE - C-SSRS
2. HAVE YOU ACTUALLY HAD ANY THOUGHTS OF KILLING YOURSELF?: NO
6. HAVE YOU EVER DONE ANYTHING, STARTED TO DO ANYTHING, OR PREPARED TO DO ANYTHING TO END YOUR LIFE?: NO
1. IN THE PAST MONTH, HAVE YOU WISHED YOU WERE DEAD OR WISHED YOU COULD GO TO SLEEP AND NOT WAKE UP?: NO

## 2025-03-18 ASSESSMENT — PAIN SCALES - GENERAL
PAINLEVEL_OUTOF10: 0 - NO PAIN
PAINLEVEL_OUTOF10: 0 - NO PAIN
PAINLEVEL_OUTOF10: 2
PAINLEVEL_OUTOF10: 0 - NO PAIN

## 2025-03-18 ASSESSMENT — PAIN DESCRIPTION - DESCRIPTORS: DESCRIPTORS: PRESSURE

## 2025-03-18 NOTE — H&P
History Of Present Illness  Salvador Plaza is a 29 y.o. male here for EGD and Bravo capsule placement.     Past Medical History  History reviewed. No pertinent past medical history.  Surgical History  Past Surgical History:   Procedure Laterality Date    OTHER SURGICAL HISTORY  01/20/2021    Tonsillectomy    OTHER SURGICAL HISTORY  01/20/2021    Hand surgery     Social History  He reports that he has been smoking cigarettes. He has never used smokeless tobacco. He reports current alcohol use of about 2.0 standard drinks of alcohol per week. He reports current drug use. Drug: Marijuana.    Family History  No family history on file.     Allergies  Allergies   Allergen Reactions    Bee Venom Protein (Honey Bee) Swelling     Review of Systems   Review of Systems   Constitutional: Negative.  Negative for activity change, appetite change, chills, fatigue, fever and unexpected weight change.   HENT: Negative.     Respiratory: Negative.     Cardiovascular: Negative.  Negative for chest pain and palpitations.   Gastrointestinal: Negative.  Negative for abdominal distention, abdominal pain, anal bleeding, blood in stool, constipation, diarrhea, nausea, rectal pain and vomiting.   Skin: Negative.  Negative for color change and rash.   Neurological: Negative.  Negative for dizziness, tremors, seizures, weakness and headaches.   Psychiatric/Behavioral: Negative.  Negative for confusion.     Physical Exam   General appearance: No acute distress, cooperative.  Eyes: Nonicteric, no redness or proptosis  Ears, nose, mouth, and throat: Moist mucous membranes, tongue normal  Neck: Supple, no lymphadenopathy or thyromegaly  Lungs: Clear to auscultation bilaterally  CV: Regular rate and rhythm, no murmur; no pitting edema in the lower extremities  Abd: soft, non-tender; non-distended; normal active bowel sounds; no  scars  Skin: No rashes or lesions; no liver stigmata  MSK: No deformities or joint edema/redness/tenderness  Neuro:  "Alert and oriented ×3, normal gait, non-focal no asterixis    Last Recorded Vitals  Blood pressure 121/71, pulse 70, temperature 36.3 °C (97.3 °F), temperature source Temporal, resp. rate 16, height 1.854 m (6' 1\"), weight 80.3 kg (177 lb), SpO2 99%.    Assessment/Plan        Mandie Ramírez MD  "

## 2025-03-18 NOTE — ANESTHESIA POSTPROCEDURE EVALUATION
Patient: Salvador Plaza    Procedure Summary       Date: 03/18/25 Room / Location: [facility] - [place], [place]    Anesthesia Start: 0840 Anesthesia Stop: 0909    Procedures:       EGD      BRAVO Diagnosis:       Gastroesophageal reflux disease without esophagitis      Chest discomfort    Scheduled Providers: Mandie Ramírez MD Responsible Provider: Meaghan Fulton MD    Anesthesia Type: general ASA Status: 2            Anesthesia Type: general    Vitals Value Taken Time   /60 03/18/25 0909   Temp 36.1 03/18/25 0909   Pulse 80 03/18/25 0909   Resp 12 03/18/25 0909   SpO2 96 03/18/25 0909       Anesthesia Post Evaluation    Patient location during evaluation: PACU  Patient participation: waiting for patient participation  Level of consciousness: sleepy but conscious  Pain management: adequate  Multimodal analgesia pain management approach  Airway patency: patent  Cardiovascular status: acceptable, hemodynamically stable and stable  Respiratory status: acceptable and room air  Hydration status: acceptable  Postoperative Nausea and Vomiting: none        There were no known notable events for this encounter.

## 2025-03-18 NOTE — ANESTHESIA PREPROCEDURE EVALUATION
Patient: Salvador Plaza    Procedure Information       Anesthesia Start Date/Time: 03/18/25 0840    Scheduled providers: Mandie Ramírez MD    Procedures:       EGD      BRAVO    Location: [facility] - [place]            Relevant Problems   Cardiac   (+) Other chest pain      Neuro   (+) Generalized anxiety disorder      GI   (+) GERD (gastroesophageal reflux disease)       Clinical information reviewed:   Tobacco  Allergies  Meds  Problems  Med Hx  Surg Hx   Fam Hx  Soc   Hx        NPO Detail:  NPO/Void Status  Carbohydrate Drink Given Prior to Surgery? : N  Date of Last Liquid: 03/17/25  Time of Last Liquid: 2000  Date of Last Solid: 03/17/25  Time of Last Solid: 2000  Last Intake Type: Clear fluids  Time of Last Void: 0600         Physical Exam    Airway  Mallampati: I  TM distance: >3 FB  Neck ROM: full     Cardiovascular - normal exam  Rhythm: regular  Rate: normal     Dental - normal exam     Pulmonary - normal exam  Breath sounds clear to auscultation     Abdominal - normal exam  Abdomen: soft  Bowel sounds: normal           Anesthesia Plan    History of general anesthesia?: yes  History of complications of general anesthesia?: no    ASA 2     general and MAC   (MAC or TIVA)  The patient is a current smoker.  Patient was previously instructed to abstain from smoking on day of procedure.  Patient did not smoke on day of procedure.  Education provided regarding risk of obstructive sleep apnea.  intravenous induction   Anesthetic plan and risks discussed with patient.  Use of blood products discussed with patient who consented to blood products.    Plan discussed with CRNA.

## 2025-03-18 NOTE — DISCHARGE INSTRUCTIONS

## 2025-03-25 LAB
LABORATORY COMMENT REPORT: NORMAL
PATH REPORT.FINAL DX SPEC: NORMAL
PATH REPORT.GROSS SPEC: NORMAL
PATH REPORT.RELEVANT HX SPEC: NORMAL
PATH REPORT.TOTAL CANCER: NORMAL

## 2025-04-04 DIAGNOSIS — K21.9 GASTROESOPHAGEAL REFLUX DISEASE, UNSPECIFIED WHETHER ESOPHAGITIS PRESENT: Primary | ICD-10-CM

## 2025-04-06 RX ORDER — OMEPRAZOLE 40 MG/1
40 CAPSULE, DELAYED RELEASE ORAL DAILY
Qty: 90 CAPSULE | Refills: 3 | Status: SHIPPED | OUTPATIENT
Start: 2025-04-06 | End: 2026-04-06